# Patient Record
Sex: MALE | Race: WHITE | NOT HISPANIC OR LATINO | Employment: OTHER | URBAN - METROPOLITAN AREA
[De-identification: names, ages, dates, MRNs, and addresses within clinical notes are randomized per-mention and may not be internally consistent; named-entity substitution may affect disease eponyms.]

---

## 2024-04-20 ENCOUNTER — APPOINTMENT (OUTPATIENT)
Dept: CARDIOLOGY | Facility: HOSPITAL | Age: 84
End: 2024-04-20
Payer: MEDICARE

## 2024-04-20 ENCOUNTER — HOSPITAL ENCOUNTER (EMERGENCY)
Facility: HOSPITAL | Age: 84
Discharge: NURSING, REHAB OR LONG-TERM CARE | End: 2024-04-21
Attending: STUDENT IN AN ORGANIZED HEALTH CARE EDUCATION/TRAINING PROGRAM
Payer: MEDICARE

## 2024-04-20 DIAGNOSIS — E86.0 DEHYDRATION: ICD-10-CM

## 2024-04-20 DIAGNOSIS — T83.021A DISPLACEMENT OF FOLEY CATHETER, INITIAL ENCOUNTER (CMS-HCC): Primary | ICD-10-CM

## 2024-04-20 LAB
ALBUMIN SERPL BCP-MCNC: 3 G/DL (ref 3.4–5)
ALP SERPL-CCNC: 47 U/L (ref 33–136)
ALT SERPL W P-5'-P-CCNC: 18 U/L (ref 10–52)
ANION GAP SERPL CALC-SCNC: 12 MMOL/L (ref 10–20)
AST SERPL W P-5'-P-CCNC: 22 U/L (ref 9–39)
BASOPHILS # BLD AUTO: 0.1 X10*3/UL (ref 0–0.1)
BASOPHILS NFR BLD AUTO: 0.7 %
BILIRUB SERPL-MCNC: 0.4 MG/DL (ref 0–1.2)
BUN SERPL-MCNC: 31 MG/DL (ref 6–23)
CALCIUM SERPL-MCNC: 9 MG/DL (ref 8.6–10.3)
CHLORIDE SERPL-SCNC: 114 MMOL/L (ref 98–107)
CO2 SERPL-SCNC: 24 MMOL/L (ref 21–32)
CREAT SERPL-MCNC: 1.65 MG/DL (ref 0.5–1.3)
EGFRCR SERPLBLD CKD-EPI 2021: 41 ML/MIN/1.73M*2
EOSINOPHIL # BLD AUTO: 0.43 X10*3/UL (ref 0–0.4)
EOSINOPHIL NFR BLD AUTO: 3.1 %
ERYTHROCYTE [DISTWIDTH] IN BLOOD BY AUTOMATED COUNT: 14 % (ref 11.5–14.5)
GLUCOSE SERPL-MCNC: 130 MG/DL (ref 74–99)
HCT VFR BLD AUTO: 41.9 % (ref 41–52)
HGB BLD-MCNC: 13.4 G/DL (ref 13.5–17.5)
IMM GRANULOCYTES # BLD AUTO: 0.2 X10*3/UL (ref 0–0.5)
IMM GRANULOCYTES NFR BLD AUTO: 1.4 % (ref 0–0.9)
LACTATE SERPL-SCNC: 1.9 MMOL/L (ref 0.4–2)
LYMPHOCYTES # BLD AUTO: 1.18 X10*3/UL (ref 0.8–3)
LYMPHOCYTES NFR BLD AUTO: 8.4 %
MAGNESIUM SERPL-MCNC: 2.11 MG/DL (ref 1.6–2.4)
MCH RBC QN AUTO: 30.3 PG (ref 26–34)
MCHC RBC AUTO-ENTMCNC: 32 G/DL (ref 32–36)
MCV RBC AUTO: 95 FL (ref 80–100)
MONOCYTES # BLD AUTO: 0.79 X10*3/UL (ref 0.05–0.8)
MONOCYTES NFR BLD AUTO: 5.6 %
NEUTROPHILS # BLD AUTO: 11.3 X10*3/UL (ref 1.6–5.5)
NEUTROPHILS NFR BLD AUTO: 80.8 %
NRBC BLD-RTO: 0 /100 WBCS (ref 0–0)
PLATELET # BLD AUTO: 370 X10*3/UL (ref 150–450)
POTASSIUM SERPL-SCNC: 4.4 MMOL/L (ref 3.5–5.3)
PROT SERPL-MCNC: 6.4 G/DL (ref 6.4–8.2)
RBC # BLD AUTO: 4.42 X10*6/UL (ref 4.5–5.9)
SODIUM SERPL-SCNC: 146 MMOL/L (ref 136–145)
WBC # BLD AUTO: 14 X10*3/UL (ref 4.4–11.3)

## 2024-04-20 PROCEDURE — 80053 COMPREHEN METABOLIC PANEL: CPT | Performed by: STUDENT IN AN ORGANIZED HEALTH CARE EDUCATION/TRAINING PROGRAM

## 2024-04-20 PROCEDURE — 2500000004 HC RX 250 GENERAL PHARMACY W/ HCPCS (ALT 636 FOR OP/ED): Performed by: STUDENT IN AN ORGANIZED HEALTH CARE EDUCATION/TRAINING PROGRAM

## 2024-04-20 PROCEDURE — 81001 URINALYSIS AUTO W/SCOPE: CPT | Performed by: STUDENT IN AN ORGANIZED HEALTH CARE EDUCATION/TRAINING PROGRAM

## 2024-04-20 PROCEDURE — 93005 ELECTROCARDIOGRAM TRACING: CPT | Mod: 59

## 2024-04-20 PROCEDURE — 36415 COLL VENOUS BLD VENIPUNCTURE: CPT | Performed by: STUDENT IN AN ORGANIZED HEALTH CARE EDUCATION/TRAINING PROGRAM

## 2024-04-20 PROCEDURE — 83735 ASSAY OF MAGNESIUM: CPT | Performed by: STUDENT IN AN ORGANIZED HEALTH CARE EDUCATION/TRAINING PROGRAM

## 2024-04-20 PROCEDURE — 96360 HYDRATION IV INFUSION INIT: CPT

## 2024-04-20 PROCEDURE — 83605 ASSAY OF LACTIC ACID: CPT | Performed by: STUDENT IN AN ORGANIZED HEALTH CARE EDUCATION/TRAINING PROGRAM

## 2024-04-20 PROCEDURE — 99283 EMERGENCY DEPT VISIT LOW MDM: CPT | Mod: 25

## 2024-04-20 PROCEDURE — 85025 COMPLETE CBC W/AUTO DIFF WBC: CPT | Performed by: STUDENT IN AN ORGANIZED HEALTH CARE EDUCATION/TRAINING PROGRAM

## 2024-04-20 PROCEDURE — 51702 INSERT TEMP BLADDER CATH: CPT

## 2024-04-20 RX ORDER — HALOPERIDOL 5 MG/ML
5 INJECTION INTRAMUSCULAR ONCE
Status: DISCONTINUED | OUTPATIENT
Start: 2024-04-20 | End: 2024-04-21 | Stop reason: HOSPADM

## 2024-04-20 RX ORDER — HALOPERIDOL 5 MG/ML
5 INJECTION INTRAMUSCULAR ONCE
Status: DISCONTINUED | OUTPATIENT
Start: 2024-04-20 | End: 2024-04-20

## 2024-04-20 RX ADMIN — SODIUM CHLORIDE, POTASSIUM CHLORIDE, SODIUM LACTATE AND CALCIUM CHLORIDE 1000 ML: 600; 310; 30; 20 INJECTION, SOLUTION INTRAVENOUS at 22:14

## 2024-04-20 ASSESSMENT — PAIN SCALES - PAIN ASSESSMENT IN ADVANCED DEMENTIA (PAINAD)
FACIALEXPRESSION: SMILING OR INEXPRESSIVE
CONSOLABILITY: NO NEED TO CONSOLE
BREATHING: NORMAL
BODYLANGUAGE: RELAXED
TOTALSCORE: 0

## 2024-04-20 ASSESSMENT — COLUMBIA-SUICIDE SEVERITY RATING SCALE - C-SSRS
2. HAVE YOU ACTUALLY HAD ANY THOUGHTS OF KILLING YOURSELF?: NO
1. IN THE PAST MONTH, HAVE YOU WISHED YOU WERE DEAD OR WISHED YOU COULD GO TO SLEEP AND NOT WAKE UP?: NO
6. HAVE YOU EVER DONE ANYTHING, STARTED TO DO ANYTHING, OR PREPARED TO DO ANYTHING TO END YOUR LIFE?: NO

## 2024-04-20 ASSESSMENT — PAIN - FUNCTIONAL ASSESSMENT: PAIN_FUNCTIONAL_ASSESSMENT: PAINAD (PAIN ASSESSMENT IN ADVANCED DEMENTIA SCALE)

## 2024-04-21 VITALS
TEMPERATURE: 96.3 F | DIASTOLIC BLOOD PRESSURE: 78 MMHG | SYSTOLIC BLOOD PRESSURE: 110 MMHG | HEIGHT: 72 IN | BODY MASS INDEX: 24.38 KG/M2 | HEART RATE: 110 BPM | WEIGHT: 180 LBS | OXYGEN SATURATION: 99 % | RESPIRATION RATE: 18 BRPM

## 2024-04-21 LAB
APPEARANCE UR: CLEAR
BILIRUB UR STRIP.AUTO-MCNC: NEGATIVE MG/DL
COLOR UR: YELLOW
GLUCOSE UR STRIP.AUTO-MCNC: NEGATIVE MG/DL
HOLD SPECIMEN: NORMAL
HYALINE CASTS #/AREA URNS AUTO: ABNORMAL /LPF
KETONES UR STRIP.AUTO-MCNC: NEGATIVE MG/DL
LEUKOCYTE ESTERASE UR QL STRIP.AUTO: NEGATIVE
MUCOUS THREADS #/AREA URNS AUTO: ABNORMAL /LPF
NITRITE UR QL STRIP.AUTO: NEGATIVE
PH UR STRIP.AUTO: 5 [PH]
PROT UR STRIP.AUTO-MCNC: NEGATIVE MG/DL
RBC # UR STRIP.AUTO: ABNORMAL /UL
RBC #/AREA URNS AUTO: >20 /HPF
SP GR UR STRIP.AUTO: 1.02
UROBILINOGEN UR STRIP.AUTO-MCNC: <2 MG/DL
WBC #/AREA URNS AUTO: ABNORMAL /HPF

## 2024-04-21 NOTE — ED TRIAGE NOTES
Patient arrives via EMS from SNF for dislodged meyer. Per EMS, patient pulled out meyer and would not let staff replacement it. Patient is AxOx1 at baseline. Patient also presents tachycardic and hypotensive to ED.

## 2024-04-21 NOTE — ED PROVIDER NOTES
HPI   Chief Complaint   Patient presents with    Dislodged Romero       HPI     Patient is an 83-year-old male presenting for evaluation after he pulled his Romero out.  Reportedly he was being sent in for replacement of his Romero however notably patient on arrival is hypotensive tachycardic.  On review of history patient was recently hospitalized at [facility] and discharged back to nursing facility just 24 hours ago.  At that time he was in the [facility] unit given combative behavior in the setting of history of dementia as well as had a urinary tract infection.  Patient's son at bedside provides some collateral as patient is alert and oriented x 1 at baseline.  Patient's son at bedside states he is currently at his baseline.  He also informs that patient does have advanced directives and is a DNR comfort care measures.  This is different from paperwork presented at bedside which does state patient is full code.               Romulus Coma Scale Score: 14                     Patient History   No past medical history on file.  No past surgical history on file.  No family history on file.  Social History     Tobacco Use    Smoking status: Not on file    Smokeless tobacco: Not on file   Substance Use Topics    Alcohol use: Not on file    Drug use: Not on file       Physical Exam   ED Triage Vitals [04/20/24 2136]   Temperature Heart Rate Respirations BP   35.7 °C (96.3 °F) (!) 150 18 80/53      Pulse Ox Temp Source Heart Rate Source Patient Position   95 % Temporal Monitor --      BP Location FiO2 (%)     -- --       Physical Exam  Vitals and nursing note reviewed.   Constitutional:       General: He is not in acute distress.     Appearance: He is well-developed.   HENT:      Head: Normocephalic and atraumatic.   Eyes:      Conjunctiva/sclera: Conjunctivae normal.   Cardiovascular:      Rate and Rhythm: Regular rhythm. Tachycardia present.      Heart sounds: No murmur heard.  Pulmonary:      Effort: Pulmonary effort is  normal. No respiratory distress.      Breath sounds: Normal breath sounds.   Abdominal:      Palpations: Abdomen is soft.      Tenderness: There is abdominal tenderness in the suprapubic area. There is no guarding or rebound.   Musculoskeletal:         General: No swelling.      Cervical back: Neck supple.   Skin:     General: Skin is warm and dry.      Capillary Refill: Capillary refill takes less than 2 seconds.   Neurological:      Mental Status: He is alert. Mental status is at baseline.         ED Course & [facility]   ED Course as of 04/21/24 0035   Sat Apr 20, 2024 2232 CBC and Auto Differential(!)  Reviewed noted leukocytosis [AH]   2232 Comprehensive metabolic panel(!)  Reviewed and reassuring []   2232 Magnesium  wnl []   2232 Lactate  wnl []      ED Course User Index  [] Astrid Wisdom MD         Diagnoses as of 04/21/24 0035   Displacement of Romero catheter, initial encounter ([facility])   Dehydration       Medical Decision Making    Patient otherwise without acute issues per son.  Patient is an 83-year-old male presenting as above.  Arrival hemodynamically tachycardic and hypotensive.  He responded to fluids.  He is some suprapubic tenderness will attempt to replace Romero.  After discussion with son and clarification the patient is comfort care only will work on providing comfort measures with Romero placement and labs sent off initially on arrival though on review just notable for some leukocytosis.  Pending urinalysis for rule out UTI, stability of blood pressure after fluids and hydration and Romero drainage if remaining stable patient's son states that he would prefer patient to be discharged home which I do believe is appropriate given his underlying comorbidities and comfort care status.    Patient's labs reviewed, urinalysis bland no signs of infection.  Discussed with patient's son at bedside.  Notably patient is DNR comfort care.  After fluids and hydration and urinalysis returns patient's son  feels comfortable with patient being discharged back to nursing facility.  Of note his blood pressure is significantly improved.  His urine is fully dark in color, suspect possible dehydration and volume status as etiology of patient's lower blood pressure.  Ultimately will be discharged back to nursing facility.      Procedure  Procedures     Astrid Wisdom MD  04/21/24 0035

## 2024-04-24 LAB
ATRIAL RATE: 112 BPM
PR INTERVAL: 143 MS
Q ONSET: 254 MS
QRS COUNT: 18 BEATS
QRS DURATION: 123 MS
QT INTERVAL: 349 MS
QTC CALCULATION(BAZETT): 485 MS
QTC FREDERICIA: 435 MS
R AXIS: -67 DEGREES
T AXIS: 5 DEGREES
T OFFSET: 429 MS
VENTRICULAR RATE: 116 BPM